# Patient Record
Sex: FEMALE | Race: WHITE | NOT HISPANIC OR LATINO | Employment: OTHER
[De-identification: names, ages, dates, MRNs, and addresses within clinical notes are randomized per-mention and may not be internally consistent; named-entity substitution may affect disease eponyms.]

---

## 2017-06-29 ENCOUNTER — IMAGING SERVICES (OUTPATIENT)
Dept: OTHER | Age: 57
End: 2017-06-29

## 2017-06-29 ENCOUNTER — CHARTING TRANS (OUTPATIENT)
Dept: OTHER | Age: 57
End: 2017-06-29

## 2018-11-03 VITALS
WEIGHT: 133.38 LBS | DIASTOLIC BLOOD PRESSURE: 70 MMHG | TEMPERATURE: 98 F | SYSTOLIC BLOOD PRESSURE: 115 MMHG | OXYGEN SATURATION: 98 % | HEART RATE: 73 BPM | RESPIRATION RATE: 16 BRPM

## 2022-04-02 ENCOUNTER — HOSPITAL ENCOUNTER (EMERGENCY)
Age: 62
Discharge: HOME | End: 2022-04-02
Attending: EMERGENCY MEDICINE

## 2022-04-02 ENCOUNTER — APPOINTMENT (OUTPATIENT)
Dept: GENERAL RADIOLOGY | Age: 62
End: 2022-04-02
Attending: EMERGENCY MEDICINE

## 2022-04-02 VITALS
HEART RATE: 84 BPM | OXYGEN SATURATION: 100 % | DIASTOLIC BLOOD PRESSURE: 72 MMHG | RESPIRATION RATE: 20 BRPM | SYSTOLIC BLOOD PRESSURE: 138 MMHG | HEIGHT: 64 IN | BODY MASS INDEX: 22.89 KG/M2 | TEMPERATURE: 97.6 F

## 2022-04-02 DIAGNOSIS — R55 SYNCOPE, UNSPECIFIED SYNCOPE TYPE: Primary | ICD-10-CM

## 2022-04-02 LAB
ALBUMIN SERPL-MCNC: 3.6 G/DL (ref 3.6–5.1)
ALBUMIN/GLOB SERPL: 1.1 {RATIO} (ref 1–2.4)
ALP SERPL-CCNC: 98 UNITS/L (ref 45–117)
ALT SERPL-CCNC: 25 UNITS/L
ANION GAP SERPL CALC-SCNC: 8 MMOL/L (ref 10–20)
AST SERPL-CCNC: 23 UNITS/L
BASOPHILS # BLD: 0.1 K/MCL (ref 0–0.3)
BASOPHILS NFR BLD: 1 %
BILIRUB SERPL-MCNC: 0.2 MG/DL (ref 0.2–1)
BUN SERPL-MCNC: 25 MG/DL (ref 6–20)
BUN/CREAT SERPL: 26 (ref 7–25)
CALCIUM SERPL-MCNC: 9 MG/DL (ref 8.4–10.2)
CHLORIDE SERPL-SCNC: 105 MMOL/L (ref 98–107)
CO2 SERPL-SCNC: 29 MMOL/L (ref 21–32)
CREAT SERPL-MCNC: 0.97 MG/DL (ref 0.51–0.95)
DEPRECATED RDW RBC: 43.1 FL (ref 39–50)
EOSINOPHIL # BLD: 0.4 K/MCL (ref 0–0.5)
EOSINOPHIL NFR BLD: 4 %
ERYTHROCYTE [DISTWIDTH] IN BLOOD: 13.2 % (ref 11–15)
FASTING DURATION TIME PATIENT: ABNORMAL H
GFR SERPLBLD BASED ON 1.73 SQ M-ARVRAT: 62 ML/MIN
GLOBULIN SER-MCNC: 3.2 G/DL (ref 2–4)
GLUCOSE SERPL-MCNC: 122 MG/DL (ref 70–99)
HCT VFR BLD CALC: 34.7 % (ref 36–46.5)
HGB BLD-MCNC: 11.2 G/DL (ref 12–15.5)
IMM GRANULOCYTES # BLD AUTO: 0 K/MCL (ref 0–0.2)
IMM GRANULOCYTES # BLD: 0 %
LYMPHOCYTES # BLD: 3 K/MCL (ref 1–4)
LYMPHOCYTES NFR BLD: 32 %
MCH RBC QN AUTO: 28.8 PG (ref 26–34)
MCHC RBC AUTO-ENTMCNC: 32.3 G/DL (ref 32–36.5)
MCV RBC AUTO: 89.2 FL (ref 78–100)
MONOCYTES # BLD: 0.6 K/MCL (ref 0.3–0.9)
MONOCYTES NFR BLD: 7 %
NEUTROPHILS # BLD: 5.3 K/MCL (ref 1.8–7.7)
NEUTROPHILS NFR BLD: 56 %
NRBC BLD MANUAL-RTO: 0 /100 WBC
PLATELET # BLD AUTO: 343 K/MCL (ref 140–450)
POTASSIUM SERPL-SCNC: 4.2 MMOL/L (ref 3.4–5.1)
PROT SERPL-MCNC: 6.8 G/DL (ref 6.4–8.2)
RBC # BLD: 3.89 MIL/MCL (ref 4–5.2)
SODIUM SERPL-SCNC: 138 MMOL/L (ref 135–145)
TROPONIN I SERPL DL<=0.01 NG/ML-MCNC: <4 NG/L
WBC # BLD: 9.4 K/MCL (ref 4.2–11)

## 2022-04-02 PROCEDURE — 71045 X-RAY EXAM CHEST 1 VIEW: CPT

## 2022-04-02 PROCEDURE — 99285 EMERGENCY DEPT VISIT HI MDM: CPT

## 2022-04-02 PROCEDURE — 10002807 HB RX 258: Performed by: EMERGENCY MEDICINE

## 2022-04-02 PROCEDURE — 85025 COMPLETE CBC W/AUTO DIFF WBC: CPT | Performed by: EMERGENCY MEDICINE

## 2022-04-02 PROCEDURE — 96360 HYDRATION IV INFUSION INIT: CPT

## 2022-04-02 PROCEDURE — 93005 ELECTROCARDIOGRAM TRACING: CPT | Performed by: EMERGENCY MEDICINE

## 2022-04-02 PROCEDURE — 80053 COMPREHEN METABOLIC PANEL: CPT | Performed by: EMERGENCY MEDICINE

## 2022-04-02 PROCEDURE — 84484 ASSAY OF TROPONIN QUANT: CPT | Performed by: EMERGENCY MEDICINE

## 2022-04-02 RX ADMIN — SODIUM CHLORIDE 1000 ML: 9 INJECTION, SOLUTION INTRAVENOUS at 21:28

## 2022-04-02 ASSESSMENT — MOVEMENT AND STRENGTH ASSESSMENTS: ALL_EXTREMITIES: EQUAL STRENGTH/TONE/MOVEMENT

## 2022-04-02 ASSESSMENT — PAIN SCALES - GENERAL
PAINLEVEL_OUTOF10: 0

## 2022-04-03 LAB
ATRIAL RATE (BPM): 74
P AXIS (DEGREES): 60
PR-INTERVAL (MSEC): 198
QRS-INTERVAL (MSEC): 80
QT-INTERVAL (MSEC): 392
QTC: 435
R AXIS (DEGREES): 88
RAINBOW EXTRA TUBES HOLD SPECIMEN: NORMAL
REPORT TEXT: NORMAL
T AXIS (DEGREES): 67
VENTRICULAR RATE EKG/MIN (BPM): 74

## 2022-12-27 ENCOUNTER — WALK IN (OUTPATIENT)
Dept: URGENT CARE | Age: 62
End: 2022-12-27
Attending: EMERGENCY MEDICINE

## 2022-12-27 VITALS
TEMPERATURE: 98.1 F | DIASTOLIC BLOOD PRESSURE: 82 MMHG | WEIGHT: 128 LBS | BODY MASS INDEX: 21.97 KG/M2 | OXYGEN SATURATION: 99 % | HEART RATE: 77 BPM | RESPIRATION RATE: 16 BRPM | SYSTOLIC BLOOD PRESSURE: 131 MMHG

## 2022-12-27 DIAGNOSIS — H81.399 PERIPHERAL VERTIGO, UNSPECIFIED LATERALITY: Primary | ICD-10-CM

## 2022-12-27 DIAGNOSIS — H65.03 BILATERAL ACUTE SEROUS OTITIS MEDIA, RECURRENCE NOT SPECIFIED: ICD-10-CM

## 2022-12-27 PROCEDURE — 99212 OFFICE O/P EST SF 10 MIN: CPT

## 2022-12-27 RX ORDER — FLUTICASONE PROPIONATE 50 MCG
SPRAY, SUSPENSION (ML) NASAL
COMMUNITY
Start: 2022-09-13

## 2022-12-27 RX ORDER — ATORVASTATIN CALCIUM 40 MG/1
TABLET, FILM COATED ORAL
COMMUNITY

## 2022-12-27 RX ORDER — LORATADINE 10 MG/1
TABLET ORAL
COMMUNITY

## 2022-12-27 ASSESSMENT — ENCOUNTER SYMPTOMS
CONSTITUTIONAL NEGATIVE: 1
ENDOCRINE NEGATIVE: 1
NEUROLOGICAL NEGATIVE: 1
PSYCHIATRIC NEGATIVE: 1
HEMATOLOGIC/LYMPHATIC NEGATIVE: 1
GASTROINTESTINAL NEGATIVE: 1
RESPIRATORY NEGATIVE: 1
EYES NEGATIVE: 1
ALLERGIC/IMMUNOLOGIC NEGATIVE: 1

## 2025-04-15 ENCOUNTER — TELEPHONE (OUTPATIENT)
Dept: PHYSICAL MEDICINE AND REHAB | Facility: CLINIC | Age: 65
End: 2025-04-15

## 2025-04-15 ENCOUNTER — MED REC SCAN ONLY (OUTPATIENT)
Dept: PHYSICAL MEDICINE AND REHAB | Facility: CLINIC | Age: 65
End: 2025-04-15

## 2025-04-21 ENCOUNTER — OFFICE VISIT (OUTPATIENT)
Dept: PHYSICAL MEDICINE AND REHAB | Facility: CLINIC | Age: 65
End: 2025-04-21
Payer: MEDICARE

## 2025-04-21 ENCOUNTER — TELEPHONE (OUTPATIENT)
Dept: PHYSICAL MEDICINE AND REHAB | Facility: CLINIC | Age: 65
End: 2025-04-21

## 2025-04-21 DIAGNOSIS — M47.816 LUMBAR SPONDYLOSIS: Primary | ICD-10-CM

## 2025-04-21 PROBLEM — K58.9 IRRITABLE BOWEL: Status: ACTIVE | Noted: 2024-09-20

## 2025-04-21 PROBLEM — E78.5 DYSLIPIDEMIA: Status: ACTIVE | Noted: 2024-09-20

## 2025-04-21 PROBLEM — E78.00 HYPERCHOLESTEROLEMIA: Status: ACTIVE | Noted: 2022-10-20

## 2025-04-21 NOTE — PROGRESS NOTES
[facility]  Progress Note    CHIEF COMPLAINT:    Chief Complaint   Patient presents with    New Patient     New patient is here for low back pain. Patient states the pain is an intermittent ache and throbbing, Pain 5/10. Patient reports the pain began 9 weeks ago, but no recent injury sustained. Imaging completed 04/11/2025. Denies N/T. Denies weakness. Denies injections. Admits PT and finds little to no relief. Admits celebrex taken daily.        History of Present Illness:  Savannah Gonzalez is a 64 year old female who is being seen for a new patient evaluation.  I last saw the patient many years ago.  Today she has a chief complaint of bilateral buttock pain, left worse than right as well as axial pain in the posterior torso on the left side described as aching.  Symptoms are intermittent and intensity between 5 and 8 out of 10.  Symptoms began out 9 weeks ago, no recent injury.  No numbness or tingling, no weakness in the legs.  She has had chiropractic care and physical therapy with no relief.  She has had oral medications including oral steroids.  She is better but she still cannot do as much pickleball as she would like.  She can stand and walk for about 2 hours max.    PAST MEDICAL HISTORY:  Past Medical History[1]    SURGICAL HISTORY:  Past Surgical History[2]    SOCIAL HISTORY:   Social History     Occupational History    Not on file   Tobacco Use    Smoking status: Not on file    Smokeless tobacco: Not on file   Substance and Sexual Activity    Alcohol use: Not on file    Drug use: Not on file    Sexual activity: Not on file       CURRENT MEDICATIONS:   Current Medications[3]    ALLERGIES:   Allergies[4]    REVIEW OF SYSTEMS:   Patient-reported ROS  Constitutional  Sleep Disturbance: admits  Chills: denies  Fever: denies  Weight Gain: denies  Weight Loss: denies   Cardiovascular  Chest Pain: denies  Irregular Heartbeat: denies   Respiratory  Painful Breathing:  denies  Wheezing: denies   Gastrointestinal  Bowel Incontinence: denies  Heartburn: denies  Abdominal Pain: denies  Blood in Stool : denies  Rectal Pain: denies   Hematology  Easy Bruising: denies  Easy Bleeding: denies   Genitourinary  Difficulty Urinating: denies  Bladder Incontinence: denies  Pelvic Pain: admits  Painful Urination: denies   Musculoskeletal  Joint Stiffness: denies  Painful Joints: denies  Tailbone Pain: admits  Swollen Joints: denies   Peripheral Vascular  Swelling of Legs/Feet: denies  Cold Extremities: denies   Skin  Open Sores: denies  Nodules or Lumps: denies  Rash: denies   Neurological  Loss of Strength Since last Visit: admits  Tingling/Numbness: denies  Balance: denies   Psychiatric  Anxiety: denies  Depressed Mood: denies             PHYSICAL EXAM:   There were no vitals taken for this visit.    There is no height or weight on file to calculate BMI.      General: No immediate distress  Head: Normocephalic/ Atraumatic  Extremities: No lower extremity edema bilaterally. Peripheral pulses intact.   Spine:  full and painfree lumbar ROM in all directions, tender over the left paraspinals and facet joints.  Hips: full and painfree ROM   Neuro:   Cognition: alert & oriented x 3, attentive, able to follow 2 step commands, comprehention intact, spontaneous speech intact  Strength: Lower extremities have 5/5 strength  Sensation: Normal lower extremities  Reflexes: Normal lower extremities  SLR: neg    Data    Radiology Imaging:  I personally reviewed a lumbar MRI showing grade 1 L4-5 spondylolisthesis with central canal narrowing, facet arthropathy and a superimposed left paracentral disc bulge causing central canal stenosis.  There is also significant L5-S1 facet arthropathy.      ASSESSMENT AND PLAN:  1. Lumbar spondylosis  She has facet arthropathy as well as spondylolisthesis.  Pain is primarily axial with some radiation to the gluteal area but not below.  No weakness.  She has had physical  therapy, chiropractic care, oral medications with in sufficient relief.  She previously had good success with back injections.  I recommend left L4-5 and L5-S1 facet injections.  She agreed to those and we will schedule them.  - SPECIALTY (OTHER) - INTERNAL        RTC: 2 weeks postinjection      The patient was in agreement with the assessment and plan.  All questions were answered.        Aidan Heredia MD  Physical Medicine and Rehabilitation/Sports Medicine  [facility]         [1] No past medical history on file.  [2] No past surgical history on file.  [3]   No current outpatient medications on file.   [4] Not on File

## 2025-04-21 NOTE — TELEPHONE ENCOUNTER
Patient has been scheduled for Left L4-5 and L5-S1 facet injections on 4/24/2025 at the [facility] with Dr. Heredia.   Anesthesia type:  Local  Please note: The [facility] will call the business day prior to discuss the exact time/arrival and additional instructions for your appointment.  Patient was advised that if he/she does receive the covid vaccine it needs to be at least 2 weeks before or after the injection.  Medications and allergies reviewed.  Patient informed of [facility]'s  policy:  The patient will require transportation arrangements to and from the procedure, with the  present on site for the entire visit.  Without a , the appointment is subject to cancellation.    [facility] is located in the [facility] 1st floor [address], [place] [address].   may park in the yellow/purple parking lot.  Patient verbalized understanding and agrees with plan.  Scheduled in Epic: Yes  Scheduled in Surgical Case: Yes  Follow up appointment made: NOV: Visit date not found  Authorization date valid until N/A at [facility].

## 2025-04-21 NOTE — TELEPHONE ENCOUNTER
Per CMS Guidelines -no authorization is required for Left L45 and L5-S1 facet injections.  CPT codes: 70762, 12281     Status: Authorization is not required based on medical necessity however is not a guarantee of payment and may be subject to review once claim is submitted.  If this procedure is being performed at Outpatient Hospital[facility]- authorization is required, please notify this writer.

## 2025-04-24 PROBLEM — M47.816 LUMBAR SPONDYLOSIS: Status: ACTIVE | Noted: 2025-04-24

## (undated) NOTE — LETTER
WHERE IS YOUR PAIN NOW?  Sobeida the areas on your body where you feel the described sensations.  Use the appropriate symbol.  Sobeida the areas of radiation.  Include all affected areas.  Just to complete the picture, please draw in the face.     ACHE:  ^ ^ ^   NUMBNESS:  0000   PINS & NEEDLES:  = = = =                              ^ ^ ^                       0000              = = = =                                    ^ ^ ^                       0000            = = = =      BURNING:  XXXX   STABBING: ////                  XXXX                ////                         XXXX          ////     Please sobeida the line below indicating your degree of pain right now  with 0 being no pain 10 being the worst pain possible.                                         0             1             2              3             4              5              6              7             8             9             10         Patient Signature: